# Patient Record
Sex: MALE | Race: WHITE | Employment: OTHER | URBAN - METROPOLITAN AREA
[De-identification: names, ages, dates, MRNs, and addresses within clinical notes are randomized per-mention and may not be internally consistent; named-entity substitution may affect disease eponyms.]

---

## 2018-01-01 ENCOUNTER — HOSPITAL ENCOUNTER (INPATIENT)
Age: 63
LOS: 2 days | DRG: 208 | End: 2018-08-08
Attending: EMERGENCY MEDICINE | Admitting: INTERNAL MEDICINE
Payer: COMMERCIAL

## 2018-01-01 ENCOUNTER — APPOINTMENT (OUTPATIENT)
Dept: GENERAL RADIOLOGY | Age: 63
DRG: 208 | End: 2018-01-01
Payer: COMMERCIAL

## 2018-01-01 VITALS
RESPIRATION RATE: 18 BRPM | WEIGHT: 187.17 LBS | TEMPERATURE: 100 F | DIASTOLIC BLOOD PRESSURE: 60 MMHG | OXYGEN SATURATION: 76 % | SYSTOLIC BLOOD PRESSURE: 112 MMHG | HEART RATE: 143 BPM

## 2018-01-01 DIAGNOSIS — I46.9 CARDIAC ARREST (HCC): Primary | ICD-10-CM

## 2018-01-01 LAB
ALBUMIN SERPL-MCNC: 4.4 G/DL (ref 3.4–5)
ALP BLD-CCNC: 74 U/L (ref 40–129)
ALT SERPL-CCNC: 293 U/L (ref 10–40)
ANION GAP SERPL CALCULATED.3IONS-SCNC: 12 MMOL/L (ref 3–16)
ANION GAP SERPL CALCULATED.3IONS-SCNC: 15 MMOL/L (ref 3–16)
APTT: 31 SEC (ref 26–36)
AST SERPL-CCNC: 421 U/L (ref 15–37)
B-TYPE NATRIURETIC PEPTIDE: 59 PG/ML (ref 0–99.9)
BASE EXCESS ARTERIAL: -3.3 MMOL/L (ref -3–3)
BASE EXCESS VENOUS: -12 (ref -3–3)
BASOPHILS ABSOLUTE: 0 K/UL (ref 0–0.2)
BASOPHILS ABSOLUTE: 0 K/UL (ref 0–0.2)
BASOPHILS RELATIVE PERCENT: 0.1 %
BASOPHILS RELATIVE PERCENT: 0.2 %
BILIRUB SERPL-MCNC: 1.3 MG/DL (ref 0–1)
BILIRUBIN DIRECT: 0.3 MG/DL (ref 0–0.3)
BILIRUBIN, INDIRECT: 1 MG/DL (ref 0–1)
BUN BLDV-MCNC: 31 MG/DL (ref 7–20)
BUN BLDV-MCNC: 42 MG/DL (ref 7–20)
CALCIUM IONIZED: 1.15 MMOL/L (ref 1.12–1.32)
CALCIUM SERPL-MCNC: 10.6 MG/DL (ref 8.3–10.6)
CALCIUM SERPL-MCNC: 9.6 MG/DL (ref 8.3–10.6)
CARBOXYHEMOGLOBIN ARTERIAL: 1.7 % (ref 0–1.5)
CHLORIDE BLD-SCNC: 106 MMOL/L (ref 99–110)
CHLORIDE BLD-SCNC: 106 MMOL/L (ref 99–110)
CO2: 20 MMOL/L (ref 21–32)
CO2: 21 MMOL/L (ref 21–32)
CO2: 21 MMOL/L (ref 21–32)
CREAT SERPL-MCNC: 1.4 MG/DL (ref 0.8–1.3)
CREAT SERPL-MCNC: 1.5 MG/DL (ref 0.8–1.3)
EKG ATRIAL RATE: 134 BPM
EKG ATRIAL RATE: 30 BPM
EKG ATRIAL RATE: 66 BPM
EKG ATRIAL RATE: 71 BPM
EKG ATRIAL RATE: 71 BPM
EKG DIAGNOSIS: NORMAL
EKG P AXIS: 34 DEGREES
EKG P AXIS: 43 DEGREES
EKG P-R INTERVAL: 184 MS
EKG P-R INTERVAL: 184 MS
EKG Q-T INTERVAL: 372 MS
EKG Q-T INTERVAL: 396 MS
EKG Q-T INTERVAL: 418 MS
EKG Q-T INTERVAL: 424 MS
EKG Q-T INTERVAL: 426 MS
EKG QRS DURATION: 120 MS
EKG QRS DURATION: 124 MS
EKG QRS DURATION: 152 MS
EKG QRS DURATION: 154 MS
EKG QRS DURATION: 82 MS
EKG QTC CALCULATION (BAZETT): 415 MS
EKG QTC CALCULATION (BAZETT): 444 MS
EKG QTC CALCULATION (BAZETT): 460 MS
EKG QTC CALCULATION (BAZETT): 460 MS
EKG QTC CALCULATION (BAZETT): 636 MS
EKG R AXIS: -2 DEGREES
EKG R AXIS: -64 DEGREES
EKG R AXIS: 10 DEGREES
EKG R AXIS: 19 DEGREES
EKG R AXIS: 8 DEGREES
EKG T AXIS: 120 DEGREES
EKG T AXIS: 125 DEGREES
EKG T AXIS: 138 DEGREES
EKG T AXIS: 154 DEGREES
EKG T AXIS: 156 DEGREES
EKG VENTRICULAR RATE: 134 BPM
EKG VENTRICULAR RATE: 66 BPM
EKG VENTRICULAR RATE: 68 BPM
EKG VENTRICULAR RATE: 71 BPM
EKG VENTRICULAR RATE: 92 BPM
EOSINOPHILS ABSOLUTE: 0 K/UL (ref 0–0.6)
EOSINOPHILS ABSOLUTE: 0 K/UL (ref 0–0.6)
EOSINOPHILS RELATIVE PERCENT: 0.1 %
EOSINOPHILS RELATIVE PERCENT: 0.2 %
GFR AFRICAN AMERICAN: 44
GFR AFRICAN AMERICAN: 57
GFR AFRICAN AMERICAN: >60
GFR NON-AFRICAN AMERICAN: 36
GFR NON-AFRICAN AMERICAN: 47
GFR NON-AFRICAN AMERICAN: 51
GLUCOSE BLD-MCNC: 142 MG/DL (ref 70–99)
GLUCOSE BLD-MCNC: 154 MG/DL (ref 70–99)
GLUCOSE BLD-MCNC: 215 MG/DL (ref 70–99)
HCO3 ARTERIAL: 21 MMOL/L (ref 21–29)
HCO3 VENOUS: 18.5 MMOL/L (ref 23–29)
HCT VFR BLD CALC: 44.3 % (ref 40.5–52.5)
HCT VFR BLD CALC: 44.6 % (ref 40.5–52.5)
HEMOGLOBIN, ART, EXTENDED: 15.2 G/DL
HEMOGLOBIN: 15 G/DL (ref 13.5–17.5)
HEMOGLOBIN: 15.1 G/DL (ref 13.5–17.5)
INR BLD: 1.09 (ref 0.86–1.14)
LACTATE: 10.95 MMOL/L (ref 0.4–2)
LV EF: 28 %
LVEF MODALITY: NORMAL
LYMPHOCYTES ABSOLUTE: 0.7 K/UL (ref 1–5.1)
LYMPHOCYTES ABSOLUTE: 1 K/UL (ref 1–5.1)
LYMPHOCYTES RELATIVE PERCENT: 6.3 %
LYMPHOCYTES RELATIVE PERCENT: 7.9 %
MAGNESIUM: 3.7 MG/DL (ref 1.8–2.4)
MCH RBC QN AUTO: 30.5 PG (ref 26–34)
MCH RBC QN AUTO: 30.7 PG (ref 26–34)
MCHC RBC AUTO-ENTMCNC: 33.8 G/DL (ref 31–36)
MCHC RBC AUTO-ENTMCNC: 33.8 G/DL (ref 31–36)
MCV RBC AUTO: 90 FL (ref 80–100)
MCV RBC AUTO: 90.6 FL (ref 80–100)
METHEMOGLOBIN ARTERIAL: 0.2 % (ref 0–1.4)
MONOCYTES ABSOLUTE: 0.7 K/UL (ref 0–1.3)
MONOCYTES ABSOLUTE: 0.8 K/UL (ref 0–1.3)
MONOCYTES RELATIVE PERCENT: 5.1 %
MONOCYTES RELATIVE PERCENT: 7.3 %
NEUTROPHILS ABSOLUTE: 10 K/UL (ref 1.7–7.7)
NEUTROPHILS ABSOLUTE: 11.4 K/UL (ref 1.7–7.7)
NEUTROPHILS RELATIVE PERCENT: 86.2 %
NEUTROPHILS RELATIVE PERCENT: 86.6 %
O2 SAT, ARTERIAL: 99 % (ref 93–100)
O2 SAT, VEN: 30 %
PCO2 ARTERIAL: 37 MMHG (ref 35–45)
PCO2, VEN: 68.9 MM HG (ref 40–50)
PDW BLD-RTO: 13.4 % (ref 12.4–15.4)
PDW BLD-RTO: 13.8 % (ref 12.4–15.4)
PERFORMED ON: ABNORMAL
PERFORMED ON: ABNORMAL
PERFORMED ON: NORMAL
PERFORMED ON: NORMAL
PH ARTERIAL: 7.37 (ref 7.35–7.45)
PH VENOUS: 7.04 (ref 7.35–7.45)
PHOSPHORUS: 3.7 MG/DL (ref 2.5–4.9)
PLATELET # BLD: 178 K/UL (ref 135–450)
PLATELET # BLD: 226 K/UL (ref 135–450)
PMV BLD AUTO: 8 FL (ref 5–10.5)
PMV BLD AUTO: 8.4 FL (ref 5–10.5)
PO2 ARTERIAL: 120 MMHG (ref 75–108)
PO2, VEN: 28 MM HG
POC ANION GAP: 17 (ref 10–20)
POC BUN: 30 MG/DL (ref 7–18)
POC CHLORIDE: 108 MMOL/L (ref 99–110)
POC CREATININE: 1.9 MG/DL (ref 0.8–1.3)
POC POTASSIUM: 3.3 MMOL/L (ref 3.5–5.1)
POC SAMPLE TYPE: ABNORMAL
POC SAMPLE TYPE: ABNORMAL
POC SAMPLE TYPE: NORMAL
POC SAMPLE TYPE: NORMAL
POC SODIUM: 145 MMOL/L (ref 136–145)
POC TROPONIN I: 0.01 NG/ML (ref 0–0.1)
POTASSIUM REFLEX MAGNESIUM: 4.6 MMOL/L (ref 3.5–5.1)
POTASSIUM SERPL-SCNC: 3.1 MMOL/L (ref 3.5–5.1)
PROTHROMBIN TIME: 12.4 SEC (ref 9.8–13)
RBC # BLD: 4.92 M/UL (ref 4.2–5.9)
RBC # BLD: 4.93 M/UL (ref 4.2–5.9)
SODIUM BLD-SCNC: 139 MMOL/L (ref 136–145)
SODIUM BLD-SCNC: 142 MMOL/L (ref 136–145)
TCO2 ARTERIAL: 22 MMOL/L
TCO2 CALC VENOUS: 21 MMOL/L
TOTAL PROTEIN: 7.1 G/DL (ref 6.4–8.2)
WBC # BLD: 11.6 K/UL (ref 4–11)
WBC # BLD: 13.1 K/UL (ref 4–11)

## 2018-01-01 PROCEDURE — 6360000002 HC RX W HCPCS: Performed by: STUDENT IN AN ORGANIZED HEALTH CARE EDUCATION/TRAINING PROGRAM

## 2018-01-01 PROCEDURE — 99233 SBSQ HOSP IP/OBS HIGH 50: CPT | Performed by: INTERNAL MEDICINE

## 2018-01-01 PROCEDURE — 6370000000 HC RX 637 (ALT 250 FOR IP): Performed by: STUDENT IN AN ORGANIZED HEALTH CARE EDUCATION/TRAINING PROGRAM

## 2018-01-01 PROCEDURE — 99291 CRITICAL CARE FIRST HOUR: CPT | Performed by: INTERNAL MEDICINE

## 2018-01-01 PROCEDURE — 5A1945Z RESPIRATORY VENTILATION, 24-96 CONSECUTIVE HOURS: ICD-10-PCS | Performed by: INTERNAL MEDICINE

## 2018-01-01 PROCEDURE — 94750 HC PULMONARY COMPLIANCE STUDY: CPT

## 2018-01-01 PROCEDURE — 2000000000 HC ICU R&B

## 2018-01-01 PROCEDURE — 94003 VENT MGMT INPAT SUBQ DAY: CPT

## 2018-01-01 PROCEDURE — 5A2204Z RESTORATION OF CARDIAC RHYTHM, SINGLE: ICD-10-PCS | Performed by: EMERGENCY MEDICINE

## 2018-01-01 PROCEDURE — 6360000002 HC RX W HCPCS: Performed by: INTERNAL MEDICINE

## 2018-01-01 PROCEDURE — 94664 DEMO&/EVAL PT USE INHALER: CPT

## 2018-01-01 PROCEDURE — 4500000026 HC ED CRITICAL CARE PROCEDURE

## 2018-01-01 PROCEDURE — 94770 HC ETCO2 MONITOR DAILY: CPT

## 2018-01-01 PROCEDURE — 80048 BASIC METABOLIC PNL TOTAL CA: CPT

## 2018-01-01 PROCEDURE — 0BH17EZ INSERTION OF ENDOTRACHEAL AIRWAY INTO TRACHEA, VIA NATURAL OR ARTIFICIAL OPENING: ICD-10-PCS | Performed by: EMERGENCY MEDICINE

## 2018-01-01 PROCEDURE — 83735 ASSAY OF MAGNESIUM: CPT

## 2018-01-01 PROCEDURE — 94002 VENT MGMT INPAT INIT DAY: CPT

## 2018-01-01 PROCEDURE — C9113 INJ PANTOPRAZOLE SODIUM, VIA: HCPCS | Performed by: INTERNAL MEDICINE

## 2018-01-01 PROCEDURE — 2500000003 HC RX 250 WO HCPCS: Performed by: EMERGENCY MEDICINE

## 2018-01-01 PROCEDURE — 84100 ASSAY OF PHOSPHORUS: CPT

## 2018-01-01 PROCEDURE — 71045 X-RAY EXAM CHEST 1 VIEW: CPT

## 2018-01-01 PROCEDURE — 83880 ASSAY OF NATRIURETIC PEPTIDE: CPT

## 2018-01-01 PROCEDURE — 82803 BLOOD GASES ANY COMBINATION: CPT

## 2018-01-01 PROCEDURE — 94761 N-INVAS EAR/PLS OXIMETRY MLT: CPT

## 2018-01-01 PROCEDURE — 99292 CRITICAL CARE ADDL 30 MIN: CPT

## 2018-01-01 PROCEDURE — 85025 COMPLETE CBC W/AUTO DIFF WBC: CPT

## 2018-01-01 PROCEDURE — 2500000003 HC RX 250 WO HCPCS

## 2018-01-01 PROCEDURE — 6360000002 HC RX W HCPCS: Performed by: EMERGENCY MEDICINE

## 2018-01-01 PROCEDURE — 85730 THROMBOPLASTIN TIME PARTIAL: CPT

## 2018-01-01 PROCEDURE — 84484 ASSAY OF TROPONIN QUANT: CPT

## 2018-01-01 PROCEDURE — 92950 HEART/LUNG RESUSCITATION CPR: CPT

## 2018-01-01 PROCEDURE — 99291 CRITICAL CARE FIRST HOUR: CPT

## 2018-01-01 PROCEDURE — 2700000000 HC OXYGEN THERAPY PER DAY

## 2018-01-01 PROCEDURE — 80047 BASIC METABLC PNL IONIZED CA: CPT

## 2018-01-01 PROCEDURE — 80076 HEPATIC FUNCTION PANEL: CPT

## 2018-01-01 PROCEDURE — 2580000003 HC RX 258: Performed by: STUDENT IN AN ORGANIZED HEALTH CARE EDUCATION/TRAINING PROGRAM

## 2018-01-01 PROCEDURE — 5A12012 PERFORMANCE OF CARDIAC OUTPUT, SINGLE, MANUAL: ICD-10-PCS | Performed by: EMERGENCY MEDICINE

## 2018-01-01 PROCEDURE — 85610 PROTHROMBIN TIME: CPT

## 2018-01-01 PROCEDURE — 6360000002 HC RX W HCPCS

## 2018-01-01 PROCEDURE — 93306 TTE W/DOPPLER COMPLETE: CPT

## 2018-01-01 PROCEDURE — 99255 IP/OBS CONSLTJ NEW/EST HI 80: CPT | Performed by: INTERNAL MEDICINE

## 2018-01-01 PROCEDURE — 36592 COLLECT BLOOD FROM PICC: CPT

## 2018-01-01 PROCEDURE — 83605 ASSAY OF LACTIC ACID: CPT

## 2018-01-01 PROCEDURE — 2580000003 HC RX 258: Performed by: EMERGENCY MEDICINE

## 2018-01-01 PROCEDURE — 93005 ELECTROCARDIOGRAM TRACING: CPT | Performed by: EMERGENCY MEDICINE

## 2018-01-01 RX ORDER — CALCIUM CHLORIDE 100 MG/ML
INJECTION INTRAVENOUS; INTRAVENTRICULAR DAILY PRN
Status: DISCONTINUED | OUTPATIENT
Start: 2018-01-01 | End: 2018-01-01

## 2018-01-01 RX ORDER — AMIODARONE HYDROCHLORIDE 50 MG/ML
INJECTION, SOLUTION INTRAVENOUS DAILY PRN
Status: DISCONTINUED | OUTPATIENT
Start: 2018-01-01 | End: 2018-01-01

## 2018-01-01 RX ORDER — ACETAMINOPHEN 325 MG/1
650 TABLET ORAL EVERY 4 HOURS PRN
Status: DISCONTINUED | OUTPATIENT
Start: 2018-01-01 | End: 2018-01-01

## 2018-01-01 RX ORDER — SCOLOPAMINE TRANSDERMAL SYSTEM 1 MG/1
1 PATCH, EXTENDED RELEASE TRANSDERMAL
Status: DISCONTINUED | OUTPATIENT
Start: 2018-01-01 | End: 2018-01-01 | Stop reason: HOSPADM

## 2018-01-01 RX ORDER — METOPROLOL TARTRATE 5 MG/5ML
5 INJECTION INTRAVENOUS ONCE
Status: COMPLETED | OUTPATIENT
Start: 2018-01-01 | End: 2018-01-01

## 2018-01-01 RX ORDER — SODIUM CHLORIDE 0.9 % (FLUSH) 0.9 %
10 SYRINGE (ML) INJECTION PRN
Status: DISCONTINUED | OUTPATIENT
Start: 2018-01-01 | End: 2018-01-01 | Stop reason: HOSPADM

## 2018-01-01 RX ORDER — PROPOFOL 10 MG/ML
10 INJECTION, EMULSION INTRAVENOUS
Status: DISCONTINUED | OUTPATIENT
Start: 2018-01-01 | End: 2018-01-01

## 2018-01-01 RX ORDER — MORPHINE SULFATE 2 MG/ML
2 INJECTION, SOLUTION INTRAMUSCULAR; INTRAVENOUS
Status: DISCONTINUED | OUTPATIENT
Start: 2018-01-01 | End: 2018-01-01 | Stop reason: HOSPADM

## 2018-01-01 RX ORDER — MORPHINE SULFATE 2 MG/ML
2 INJECTION, SOLUTION INTRAMUSCULAR; INTRAVENOUS
Status: DISCONTINUED | OUTPATIENT
Start: 2018-01-01 | End: 2018-01-01

## 2018-01-01 RX ORDER — LORAZEPAM 2 MG/ML
2 INJECTION INTRAMUSCULAR
Status: DISCONTINUED | OUTPATIENT
Start: 2018-01-01 | End: 2018-01-01 | Stop reason: HOSPADM

## 2018-01-01 RX ORDER — ATROPINE SULFATE 10 MG/ML
1 SOLUTION/ DROPS OPHTHALMIC
Status: DISCONTINUED | OUTPATIENT
Start: 2018-01-01 | End: 2018-01-01 | Stop reason: HOSPADM

## 2018-01-01 RX ORDER — ESMOLOL HYDROCHLORIDE 10 MG/ML
500 INJECTION, SOLUTION INTRAVENOUS ONCE
Status: DISCONTINUED | OUTPATIENT
Start: 2018-01-01 | End: 2018-01-01

## 2018-01-01 RX ORDER — SODIUM CHLORIDE 0.9 % (FLUSH) 0.9 %
10 SYRINGE (ML) INJECTION EVERY 12 HOURS SCHEDULED
Status: DISCONTINUED | OUTPATIENT
Start: 2018-01-01 | End: 2018-01-01 | Stop reason: HOSPADM

## 2018-01-01 RX ORDER — HYDROCORTISONE 5 MG/1
15 TABLET ORAL DAILY
COMMUNITY

## 2018-01-01 RX ORDER — PANTOPRAZOLE SODIUM 40 MG/10ML
40 INJECTION, POWDER, LYOPHILIZED, FOR SOLUTION INTRAVENOUS DAILY
Status: DISCONTINUED | OUTPATIENT
Start: 2018-01-01 | End: 2018-01-01

## 2018-01-01 RX ORDER — EPINEPHRINE 1 MG/ML
INJECTION, SOLUTION, CONCENTRATE INTRAVENOUS DAILY PRN
Status: COMPLETED | OUTPATIENT
Start: 2018-01-01 | End: 2018-01-01

## 2018-01-01 RX ORDER — ATORVASTATIN CALCIUM 80 MG/1
80 TABLET, FILM COATED ORAL NIGHTLY
COMMUNITY

## 2018-01-01 RX ORDER — CALCIUM CHLORIDE 100 MG/ML
INJECTION INTRAVENOUS; INTRAVENTRICULAR DAILY PRN
Status: COMPLETED | OUTPATIENT
Start: 2018-01-01 | End: 2018-01-01

## 2018-01-01 RX ORDER — OMEPRAZOLE 20 MG/1
20 CAPSULE, DELAYED RELEASE ORAL DAILY
COMMUNITY

## 2018-01-01 RX ORDER — MORPHINE SULFATE 2 MG/ML
2 INJECTION, SOLUTION INTRAMUSCULAR; INTRAVENOUS EVERY 4 HOURS PRN
Status: DISCONTINUED | OUTPATIENT
Start: 2018-01-01 | End: 2018-01-01

## 2018-01-01 RX ORDER — ACETAMINOPHEN 650 MG/1
650 SUPPOSITORY RECTAL ONCE
Status: COMPLETED | OUTPATIENT
Start: 2018-01-01 | End: 2018-01-01

## 2018-01-01 RX ORDER — MAGNESIUM SULFATE 1 G/100ML
INJECTION INTRAVENOUS CONTINUOUS PRN
Status: DISCONTINUED | OUTPATIENT
Start: 2018-01-01 | End: 2018-01-01

## 2018-01-01 RX ORDER — ASPIRIN 325 MG
325 TABLET ORAL DAILY
COMMUNITY

## 2018-01-01 RX ORDER — POTASSIUM CHLORIDE 29.8 MG/ML
20 INJECTION INTRAVENOUS
Status: ACTIVE | OUTPATIENT
Start: 2018-01-01 | End: 2018-01-01

## 2018-01-01 RX ORDER — LORAZEPAM 2 MG/ML
2 INJECTION INTRAMUSCULAR EVERY 4 HOURS PRN
Status: DISCONTINUED | OUTPATIENT
Start: 2018-01-01 | End: 2018-01-01

## 2018-01-01 RX ORDER — MAGNESIUM SULFATE 1 G/100ML
INJECTION INTRAVENOUS CONTINUOUS PRN
Status: COMPLETED | OUTPATIENT
Start: 2018-01-01 | End: 2018-01-01

## 2018-01-01 RX ORDER — MORPHINE SULFATE 4 MG/ML
4 INJECTION, SOLUTION INTRAMUSCULAR; INTRAVENOUS
Status: DISCONTINUED | OUTPATIENT
Start: 2018-01-01 | End: 2018-01-01

## 2018-01-01 RX ORDER — HEPARIN SODIUM 5000 [USP'U]/ML
5000 INJECTION, SOLUTION INTRAVENOUS; SUBCUTANEOUS EVERY 8 HOURS SCHEDULED
Status: DISCONTINUED | OUTPATIENT
Start: 2018-01-01 | End: 2018-01-01

## 2018-01-01 RX ORDER — LEVOTHYROXINE SODIUM 0.03 MG/1
25 TABLET ORAL DAILY
COMMUNITY

## 2018-01-01 RX ADMIN — LORAZEPAM 2 MG: 2 INJECTION INTRAMUSCULAR; INTRAVENOUS at 19:45

## 2018-01-01 RX ADMIN — SODIUM BICARBONATE 50 MEQ: 84 INJECTION, SOLUTION INTRAVENOUS at 09:52

## 2018-01-01 RX ADMIN — MORPHINE SULFATE 4 MG: 4 INJECTION INTRAVENOUS at 16:07

## 2018-01-01 RX ADMIN — EPINEPHRINE 1 MG: 0.1 INJECTION, SOLUTION ENDOTRACHEAL; INTRACARDIAC; INTRAVENOUS at 10:14

## 2018-01-01 RX ADMIN — PROPOFOL 35 MCG/KG/MIN: 10 INJECTION, EMULSION INTRAVENOUS at 08:42

## 2018-01-01 RX ADMIN — LORAZEPAM 2 MG: 2 INJECTION INTRAMUSCULAR; INTRAVENOUS at 17:22

## 2018-01-01 RX ADMIN — ATROPINE SULFATE 1 DROP: 10 SOLUTION/ DROPS OPHTHALMIC at 17:38

## 2018-01-01 RX ADMIN — METOPROLOL TARTRATE 5 MG: 5 INJECTION, SOLUTION INTRAVENOUS at 12:20

## 2018-01-01 RX ADMIN — PROPOFOL 40 MCG/KG/MIN: 10 INJECTION, EMULSION INTRAVENOUS at 02:00

## 2018-01-01 RX ADMIN — EPINEPHRINE 1 MG: 0.1 INJECTION, SOLUTION ENDOTRACHEAL; INTRACARDIAC; INTRAVENOUS at 10:03

## 2018-01-01 RX ADMIN — HEPARIN SODIUM 5000 UNITS: 5000 INJECTION, SOLUTION INTRAVENOUS; SUBCUTANEOUS at 07:32

## 2018-01-01 RX ADMIN — AMIODARONE HYDROCHLORIDE 75 MG: 50 INJECTION, SOLUTION INTRAVENOUS at 10:35

## 2018-01-01 RX ADMIN — MORPHINE SULFATE 2 MG: 2 INJECTION, SOLUTION INTRAMUSCULAR; INTRAVENOUS at 02:45

## 2018-01-01 RX ADMIN — HEPARIN SODIUM 5000 UNITS: 5000 INJECTION, SOLUTION INTRAVENOUS; SUBCUTANEOUS at 22:27

## 2018-01-01 RX ADMIN — HYDROCORTISONE SODIUM SUCCINATE 50 MG: 100 INJECTION, POWDER, FOR SOLUTION INTRAMUSCULAR; INTRAVENOUS at 16:06

## 2018-01-01 RX ADMIN — HYDROCORTISONE SODIUM SUCCINATE 50 MG: 100 INJECTION, POWDER, FOR SOLUTION INTRAMUSCULAR; INTRAVENOUS at 11:22

## 2018-01-01 RX ADMIN — EPINEPHRINE 1 MG: 0.1 INJECTION, SOLUTION ENDOTRACHEAL; INTRACARDIAC; INTRAVENOUS at 12:24

## 2018-01-01 RX ADMIN — AMIODARONE HYDROCHLORIDE 1 MG/MIN: 50 INJECTION, SOLUTION INTRAVENOUS at 10:06

## 2018-01-01 RX ADMIN — METOPROLOL TARTRATE 5 MG: 5 INJECTION, SOLUTION INTRAVENOUS at 10:35

## 2018-01-01 RX ADMIN — LORAZEPAM 2 MG: 2 INJECTION INTRAMUSCULAR; INTRAVENOUS at 03:15

## 2018-01-01 RX ADMIN — MAGNESIUM SULFATE IN DEXTROSE 2 G: 10 INJECTION, SOLUTION INTRAVENOUS at 12:12

## 2018-01-01 RX ADMIN — CALCIUM CHLORIDE 1 G: 100 INJECTION, SOLUTION INTRAVENOUS; INTRAVENTRICULAR at 12:19

## 2018-01-01 RX ADMIN — LORAZEPAM 2 MG: 2 INJECTION INTRAMUSCULAR; INTRAVENOUS at 13:41

## 2018-01-01 RX ADMIN — MORPHINE SULFATE 2 MG: 2 INJECTION, SOLUTION INTRAMUSCULAR; INTRAVENOUS at 13:14

## 2018-01-01 RX ADMIN — ATROPINE SULFATE 1 DROP: 10 SOLUTION/ DROPS OPHTHALMIC at 18:33

## 2018-01-01 RX ADMIN — SODIUM BICARBONATE 50 MEQ: 84 INJECTION, SOLUTION INTRAVENOUS at 10:23

## 2018-01-01 RX ADMIN — HYDROCORTISONE SODIUM SUCCINATE 50 MG: 100 INJECTION, POWDER, FOR SOLUTION INTRAMUSCULAR; INTRAVENOUS at 04:43

## 2018-01-01 RX ADMIN — NOREPINEPHRINE BITARTRATE 10 MCG/MIN: 1 INJECTION INTRAVENOUS at 10:37

## 2018-01-01 RX ADMIN — PANTOPRAZOLE SODIUM 40 MG: 40 INJECTION, POWDER, FOR SOLUTION INTRAVENOUS at 08:43

## 2018-01-01 RX ADMIN — CALCIUM CHLORIDE 1 G: 100 INJECTION, SOLUTION INTRAVENOUS; INTRAVENTRICULAR at 09:46

## 2018-01-01 RX ADMIN — Medication 10 ML: at 22:28

## 2018-01-01 RX ADMIN — METOPROLOL TARTRATE 5 MG: 5 INJECTION, SOLUTION INTRAVENOUS at 10:30

## 2018-01-01 RX ADMIN — EPINEPHRINE 1 MG: 0.1 INJECTION, SOLUTION ENDOTRACHEAL; INTRACARDIAC; INTRAVENOUS at 12:11

## 2018-01-01 RX ADMIN — LORAZEPAM 2 MG: 2 INJECTION INTRAMUSCULAR; INTRAVENOUS at 02:12

## 2018-01-01 RX ADMIN — NOREPINEPHRINE BITARTRATE 2 MCG/MIN: 1 INJECTION INTRAVENOUS at 10:00

## 2018-01-01 RX ADMIN — MORPHINE SULFATE 2 MG: 2 INJECTION, SOLUTION INTRAMUSCULAR; INTRAVENOUS at 18:31

## 2018-01-01 RX ADMIN — ACETAMINOPHEN 650 MG: 650 SUPPOSITORY RECTAL at 07:32

## 2018-01-01 RX ADMIN — MORPHINE SULFATE 2 MG: 2 INJECTION, SOLUTION INTRAMUSCULAR; INTRAVENOUS at 01:13

## 2018-01-01 RX ADMIN — MORPHINE SULFATE 2 MG: 2 INJECTION, SOLUTION INTRAMUSCULAR; INTRAVENOUS at 19:45

## 2018-01-01 RX ADMIN — LIDOCAINE HYDROCHLORIDE 100 MG: 20 INJECTION, SOLUTION INTRAVENOUS at 12:20

## 2018-01-01 RX ADMIN — LORAZEPAM 2 MG: 2 INJECTION INTRAMUSCULAR; INTRAVENOUS at 21:49

## 2018-01-01 RX ADMIN — PROPOFOL 20 MCG/KG/MIN: 10 INJECTION, EMULSION INTRAVENOUS at 21:10

## 2018-01-01 RX ADMIN — HYDROCORTISONE SODIUM SUCCINATE 100 MG: 100 INJECTION, POWDER, FOR SOLUTION INTRAMUSCULAR; INTRAVENOUS at 10:24

## 2018-01-01 RX ADMIN — MORPHINE SULFATE 2 MG: 2 INJECTION, SOLUTION INTRAMUSCULAR; INTRAVENOUS at 03:15

## 2018-01-01 RX ADMIN — EPINEPHRINE 1 MG: 0.1 INJECTION, SOLUTION ENDOTRACHEAL; INTRACARDIAC; INTRAVENOUS at 12:21

## 2018-01-01 RX ADMIN — EPINEPHRINE 1 MG: 0.1 INJECTION, SOLUTION ENDOTRACHEAL; INTRACARDIAC; INTRAVENOUS at 09:51

## 2018-01-01 RX ADMIN — EPINEPHRINE 1 MG: 0.1 INJECTION, SOLUTION ENDOTRACHEAL; INTRACARDIAC; INTRAVENOUS at 12:16

## 2018-01-01 RX ADMIN — MORPHINE SULFATE 2 MG: 2 INJECTION, SOLUTION INTRAMUSCULAR; INTRAVENOUS at 02:12

## 2018-01-01 RX ADMIN — LORAZEPAM 2 MG: 2 INJECTION INTRAMUSCULAR; INTRAVENOUS at 22:42

## 2018-01-01 RX ADMIN — MORPHINE SULFATE 2 MG: 2 INJECTION, SOLUTION INTRAMUSCULAR; INTRAVENOUS at 21:49

## 2018-01-01 RX ADMIN — LORAZEPAM 2 MG: 2 INJECTION INTRAMUSCULAR; INTRAVENOUS at 02:45

## 2018-01-01 RX ADMIN — Medication 10 ML: at 21:50

## 2018-01-01 RX ADMIN — HYDROCORTISONE SODIUM SUCCINATE 50 MG: 100 INJECTION, POWDER, FOR SOLUTION INTRAMUSCULAR; INTRAVENOUS at 22:26

## 2018-01-01 RX ADMIN — NOREPINEPHRINE BITARTRATE 5 MCG/MIN: 1 INJECTION INTRAVENOUS at 10:10

## 2018-01-01 RX ADMIN — EPINEPHRINE 1 MG: 1 INJECTION, SOLUTION INTRAMUSCULAR; SUBCUTANEOUS at 09:45

## 2018-01-01 RX ADMIN — LORAZEPAM 2 MG: 2 INJECTION INTRAMUSCULAR; INTRAVENOUS at 01:13

## 2018-01-01 RX ADMIN — MAGNESIUM SULFATE IN DEXTROSE 1 G: 10 INJECTION, SOLUTION INTRAVENOUS at 09:51

## 2018-01-01 RX ADMIN — Medication 10 ML: at 08:45

## 2018-01-01 RX ADMIN — ATROPINE SULFATE 1 DROP: 10 SOLUTION/ DROPS OPHTHALMIC at 19:02

## 2018-01-01 RX ADMIN — EPINEPHRINE 1 MG: 0.1 INJECTION, SOLUTION ENDOTRACHEAL; INTRACARDIAC; INTRAVENOUS at 10:28

## 2018-01-01 RX ADMIN — EPINEPHRINE 1 MG: 0.1 INJECTION, SOLUTION ENDOTRACHEAL; INTRACARDIAC; INTRAVENOUS at 09:59

## 2018-01-01 RX ADMIN — EPINEPHRINE 1 MG: 0.1 INJECTION, SOLUTION ENDOTRACHEAL; INTRACARDIAC; INTRAVENOUS at 10:18

## 2018-01-01 RX ADMIN — LIDOCAINE HYDROCHLORIDE 100 MG: 20 INJECTION, SOLUTION INTRAVENOUS at 09:44

## 2018-01-01 RX ADMIN — MORPHINE SULFATE 2 MG: 2 INJECTION, SOLUTION INTRAMUSCULAR; INTRAVENOUS at 22:43

## 2018-01-01 ASSESSMENT — PAIN SCALES - GENERAL
PAINLEVEL_OUTOF10: 0

## 2018-01-01 ASSESSMENT — PULMONARY FUNCTION TESTS
PIF_VALUE: 15
PIF_VALUE: 16
PIF_VALUE: 15
PIF_VALUE: 25
PIF_VALUE: 15
PIF_VALUE: 16

## 2018-08-06 PROBLEM — I46.9 CARDIAC ARREST (HCC): Status: ACTIVE | Noted: 2018-01-01

## 2018-08-06 NOTE — CONSULTS
40 mmHg assuming a right atrial pressure of 15 mmHg.   The right ventricle is normal in size. Right ventricular systolic function   is mildly reduced . Bi-atrial enlargement. IVC size is dilated (>2.1 cm) and   collapses < 50% with respiration consistent with elevated RA pressure (15   mmHg). Last echo done at [facility] on (3/19/18). Assessment & Plan:    Patient Active Problem List:     Cardiac arrest [facility])     Status post cardiac arrest with prolonged unresponsiveness and prolonged resuscitation  Note a candidate for left heart cath due to hemodynamic instability -cardiology following, and apparent severe neurologic impairment  Echo with severe diffuse hypokinesia, biatrial enlargement, dilated IVC  Acute respiratory failure on mechanical vent support  Suspected severe anoxic injury  Hypothermia -  with temperature of 95.4  Suspected acute kidney injury. Creatinine is 1.4, no baseline creatinine to compare  Elevated liver enzymes, likely due to shock liver  Lactic acidosis    Continue vent support  Continue amiodarone  If blood pressure drops we'll start Levophed  No cooling due to prolonged resuscitation and current temperature of 35.2C  Serial lactate  Serial troponin  Serial liver enzymes  Monitor renal function, urine output, and electrolytes  I was planning to start him on propofol, due to repeated myoclonic jerks but will hold it for now until he gets evaluated by neurology. Discussed with Dr. Maynard Rad   Start heparin and PPI prophylaxis  Code status was discussed with the wife who agreed to change it to DNR    Pt has a high probability of imminent or life-threatening deterioration requiring close monitoring, and highly complex decision-making and/or interventions of high intensity to assess, manipulate, and support his critical organ systems to prevent a likely inevitable decline which could occur if left untreated.      A total critical care time 65 minutes was used.  This includes but not limited to

## 2018-08-06 NOTE — ED NOTES
Pt presents to the ED in [address] via [employer]. Pt was at home, \"passed out while on the toilet\" per wife. Pt was shocked 4 times en route, given 2 does of epinephrine, and 1 dose of amiodarone, via an IO. CPR resumed by staff when pt arrives to the ED.       Sahara Day RN  08/06/18 2326

## 2018-08-06 NOTE — ED NOTES
Bed:  2[facility]-  Expected date:   Expected time:   Means of arrival:   Comments:  [address], RN  08/06/18 0571

## 2018-08-06 NOTE — H&P
family    Alcohol: socially drinking  Illicit drugs: patient denies use  Tobacco:  No smoking    Family History:  No family history on file. MEDICATIONS:     No current facility-administered medications on file prior to encounter. No current outpatient prescriptions on file prior to encounter. Scheduled Meds:   sodium chloride flush  10 mL Intravenous 2 times per day    hydrocortisone sodium succinate PF  50 mg Intravenous Q6H      Continuous Infusions:   norepinephrine Stopped (08/06/18 1500)    magnesium sulfate      amiodarone 450mg/250ml D5W infusion 1 mg/min (08/06/18 1006)     PRN Meds:amiodarone, EPINEPHrine PF, lidocaine (cardiac), calcium chloride, magnesium sulfate, sodium chloride flush, acetaminophen    Allergies: Allergies   Allergen Reactions    Other Anaphylaxis     Crab meat     Shellfish-Derived Products Anaphylaxis    Fenofibrate Diarrhea, Nausea And Vomiting and Other (See Comments)     Diarrhea & GI Upet    Simvastatin      Other reaction(s): Muscle Aches       PHYSICAL EXAM:         Vitals: BP (!) 142/104   Pulse 70   Temp 95.5 °F (35.3 °C) (Core)   Resp 19   Wt 187 lb 2.7 oz (84.9 kg)   SpO2 99%     I/O:      Intake/Output Summary (Last 24 hours) at 08/06/18 1652  Last data filed at 08/06/18 1609   Gross per 24 hour   Intake                0 ml   Output              295 ml   Net             -295 ml     I/O this shift:  In: -   Out: 295 [Urine:295]  No intake/output data recorded.     Physical Exam    INITIAL VITALS: BP: 89/64,  , Pulse: 152, Resp: 23, SpO2: 91 %   General: 61-year-old male currently undergoing CPR  HEENT:  head is atraumatic, pupils 3 mm, minimally reactive, sclera are clear  Neck: Trachea midline  Chest: Equal chest rise with, I gel ventilation  Cardiovascular:   Abdominal: No evidence of any significant distention  Skin: Cool, poorly perfused, no rashes  Extremities: no obvious deformities, no gross evidence of trauma  Neurologic: Unresponsive, does take spontaneous ventilation does not move any extremities to either verbal or painful stimuli                                                  ETT Day#:1  Vent Settings: Vent Mode: AC/PRVC Rate Set: 12 bmp/Vt Ordered: 500 mL/ /FiO2 : 100 %    Recent Labs      08/06/18   1524   PHART  7.371   [id number]  37.0   PO2ART  120.0*       DATA:         Labs:  CBC:   Recent Labs      08/06/18   1524   WBC  13.1*   HGB  15.1   HCT  44.6   PLT  226       BMP:   Recent Labs      08/06/18   0953  08/06/18   1524   NA   --   142   K   --   3.1*   CL   --   106   CO2  20*  21   BUN   --   31*   CREATININE  1.9*  1.4*   GLUCOSE   --   215*     LFT's:   Recent Labs      08/06/18   1524   AST  421*   ALT  293*   BILITOT  1.3*   ALKPHOS  74     Troponin:   Recent Labs      08/06/18   0951   TROPONINI  0.01     BNP:   Recent Labs      08/06/18   0957   BNP  59.0     ABGs:   Recent Labs      08/06/18   1524   PHART  7.371   [id number]  37.0   PO2ART  120.0*     INR:   Recent Labs      08/06/18   1524   INR  1.09       U/A:No results for input(s): NITRITE, COLORU, PHUR, LABCAST, WBCUA, RBCUA, MUCUS, TRICHOMONAS, YEAST, BACTERIA, CLARITYU, SPECGRAV, LEUKOCYTESUR, UROBILINOGEN, BILIRUBINUR, BLOODU, GLUCOSEU, AMORPHOUS in the last 72 hours. Invalid input(s): KETONESU    XR CHEST PORTABLE   Final Result      1. Mild cardiomegaly. 2.  ET tube in good position in the midtrachea. 3.  No localized consolidation. ASSESSMENT AND PLAN:     Kodak Zeng is a 58 y.o. male,   h/o PMHx of Mitral valve reg, Hypopituitary tumor resection  p/w ED with Hx passing out with sweating of 30 minutes duration,  who was admitted with <principal problem not specified>. Cardiac arrest: 58ears old male with PMHx of MR,  presented with Hx of sweating, passing out after large watery bowel movement. Cardiology report showed last echo was normal EF, but he developed ST segment depression that was compatible with ischemia.  He also had periods of wide complex tachycardia that was suggestive of VT but cardiology thought it probably was abberancy. The patient had a LDI a exclude ischemia using another technology. -echocardiogram.   -Levophed 16 mg in dextrose infusion  -amiodarone 450 mg in dextrose infusion  -strict I/o's       Acute kidney injury mainly 2/2 to dehydration vs decrease cardiac output : large watery bowel movement, BUN 31, creatinine 1.4   -Check RFT  -Gentle IVF           Code Status: Full Code  FEN: Diet NPO Effective Now   DISPO: ICU      This patient has been staffed and discussed with Carolin Akins MD.     Electronically Signed: Kosta Lomeli MD,   PGY-1 Medicine Resident  Pager: [phone]  8/6/2018 4:52 PM     Patient seen and examined, labs and imaging studies reviewed, agree with assessment and plan as outlined above.   Continue with care    MD Noemí Driscoll

## 2018-08-06 NOTE — CONSULTS
inability to cooperate  Cardiovascular: pulses symmetric in all 4 extremities. No peripheral edema. Psychiatric: limited exam given encephalopathy but not agitated  Neurologic  Mental status: limited exam given encephalopathy  orientation unable to assess given comatose state  General fund of knowledge limited exam given encephalopathy   Memory limited exam given encephalopathy  Attention poor  Language limited exam given encephalopathy  Comprehension not following any commands  CN2: Visual Fields: no blink to either side with threat  CN 3,4,6:  extraocular muscles with some movement to the left. Pupils 7mm and non-reactive left. Right 7mm and question slightly reactive versus hippus  CN5: facial sensation limited exam given encephalopathy  CN7:face symmetric but exam limited by ET tube  CN8: hearing limited exam given encephalopathy  CN9: limited exam given encephalopathy  CN11: limited exam given encephalopathy  CN12: limited exam given encephalopathy  Strength: some weak flexion of right upper limb and left great toe with some flexion/extension movement but otherwise no movement to central or peripheral pain. Deep tendon reflexes: depressed in all 4 extremities  Sensory: no response to pain in all 4 extremities. Cerebellar/coordination:limited exam given encephalopathy  Tone: normal in all 4 extremities  Gait: limited exam given encephalopathy and intubated with ventilator. Labs  Alt and ast: 293 and 421      Impression:  Lopez Jimenez is a 58 y.o. male who presented with recurrent, prolonged cardiac arrest now with myoclonic status epilepticus involving face and eyes starting within the first 12 hours of cardiac arrest.  I discussed with his family the implications of MSE in setting of acute anoxic brain injury.       Recommendations:  -I definitely agree with DNR status as the brain could not handle further injury  -I think comfort measures only is very appropriate in this situation given essentially no chance of recovery better than persistent vegetative state. cvEEG (possibly with paralytics) could be considered but unlikely to change his outcome.     A copy of this note was provided for MD Candi Murguia MD  [phone]  Evenings, weekends, and off weeks please discuss neurologist on-call    CC=45 min

## 2018-08-06 NOTE — ED PROVIDER NOTES
[address] [facility]          ATTENDING PHYSICIAN NOTE       Date of evaluation: 8/6/2018    Chief Complaint     Cardiac Arrest      History of Present Illness     Patti Grossman is a 58 y.o. male who presents Emergency Department in cardiac arrest.  The patient has a history of heart disease as well as a history of hypo-thyroidism and hypopituitarism status post pituitary resection. He was witnessed to have multiple syncopal episodes at home this morning and then had a witnessed arrest where he was unarousable. He had compressions started by family. CPR was continued by EMS who gave the full rounds of epinephrine as well as amiodarone IV. The patient is unable to provide a history based on his current medical condition    Review of Systems     Per family, the patient was in his usual state of health recently without any fevers, chills, nausea, vomiting, complaints of chest pain, exertional chest pain, abdominal pain or any other complaints    Past Medical, Surgical, Family, and Social History     He has no past medical history on file. He has no past surgical history on file. His family history is not on file. He     Medications     Previous Medications    No medications on file       Allergies     He has no allergies on file.     Physical Exam     INITIAL VITALS: BP: 89/64,  , Pulse: 152, Resp: 23, SpO2: 91 %   General: 60-year-old male currently undergoing CPR  HEENT:  head is atraumatic, pupils 3 mm, minimally reactive, sclera are clear  Neck: Trachea midline  Chest: Equal chest rise with, I gel ventilation  Cardiovascular: Pulseless generally poorly perfused  Abdominal: No evidence of any significant distention  Skin: Cool, poorly perfused, no rashes  Extremities: no obvious deformities, no gross evidence of trauma  Neurologic: Unresponsive, does take spontaneous ventilation does not move any extremities to either verbal or painful stimuli    Diagnostic Results     EKG

## 2018-08-06 NOTE — CONSULTS
views. Baseline Echo Findings: The LV ejection fraction at rest is 45-50%. All segments are normal. Normal LV chamber size and thickness. Post Stress Echo Findings: The LV ejection fraction post stress is  70-75%. EKG (6/19/2018: EKG showed an intraventricular conduction delay with left ventricular hypertrophy    EKG (8/6/18): initially showed undetermined intraventricular conduction block with wide QRS and accelerated junctional rhythm    EKG (post-ROSC): sinus rhythm with sinus arrhythmia with occasional PVCs, non specific intraventricular conduction delay, marked ST abnormality, possible anterior subendocardial injury    ECHO (3/19/18): Overall left ventricular ejection fraction is estimated to be 45-50%. The left ventricular function is low normal.  There is moderate concentric left ventricular hypertrophy. The left atrium is moderately dilated. The mitral valve leaflets are moderately thickened in appearance. There is mild mitral valve prolapse. There is moderate mitral regurgitation. Mild aortic root dilatation. Right ventricular systolic pressure is normal at <35 mmHg. Mild tricuspid regurgitation is present. bubble study neg for right to left shunting  bubble study neg for right to left shunting.         Current Facility-Administered Medications:     norepinephrine (LEVOPHED) 16 mg in dextrose 5 % 250 mL infusion, 2 mcg/min, Intravenous, Continuous, Angela Washington MD, Last Rate: 9.4 mL/hr at 08/06/18 1037, 10 mcg/min at 08/06/18 1037    amiodarone (CORDARONE) injection, , Intravenous, Daily PRN, Angela Washington MD, 75 mg at 08/06/18 1035    EPINEPHrine PF 1 MG/10ML injection, , Intravenous, Daily PRN, Angela Washington MD, 1 mg at 08/06/18 1224    lidocaine (cardiac) (XYLOCAINE) injection, , Intravenous, Daily PRN, Angela Washington MD, 100 mg at 08/06/18 1220    calcium chloride 10 % injection, , Intravenous, Daily PRN, Angela Washington MD, 1 g at 08/06/18 1219    magnesium sulfate 1 g in dextrose 5% 100 mL IVPB, , Intravenous, Continuous PRN, Adrian Figueroa MD, 2 g at 08/06/18 1212    esmolol (BREVIBLOC) 2.5gm/250ml NS infusion, 500 mcg/kg/min (Order-Specific), Intravenous, Once, Adrian Figueroa MD    amiodarone (CORDARONE) 450 mg in dextrose 5 % 250 mL infusion, 1 mg/min, Intravenous, Continuous, Adrian Figueroa MD, Last Rate: 33.3 mL/hr at 08/06/18 1006, 1 mg/min at 08/06/18 1006    sodium chloride flush 0.9 % injection 10 mL, 10 mL, Intravenous, 2 times per day, Sue Rider MD    sodium chloride flush 0.9 % injection 10 mL, 10 mL, Intravenous, PRN, Sue Rider MD    acetaminophen (TYLENOL) tablet 650 mg, 650 mg, Per NG tube, Q4H PRN, Sue Rider MD    hydrocortisone sodium succinate PF (SOLU-CORTEF) injection 50 mg, 50 mg, Intravenous, Q6H, Sue Rider MD  No current outpatient prescriptions on file. Assessment:  Patient Active Problem List   Diagnosis    Cardiac arrest [facility])     Plan:  Cardiac arrest;  At this time, patient not HD stable from cardiology standpoint to undergo angiogram/cardiac catheterization   -continue medical mmgt per primary team     Assessment and plan discussed with attending physician, Mateo Patel M.D. [employer] Kamilla Campos M.D. Internal Medicine PGY-1  Pager: [phone]       Staff Note      Patient seen and evaluated with the medical resident. I was physically present during the critical portions of the service when performed by the resident including the assessment and management of the patient. ECG after restoration of SR with NSST/T wave changes. Will need to stabilize patient hemodynamically and assess neurologic function after prolonged ACLS and CPR. Patient is critically ill. Continue amiodarone and levophed and continue supportive care. I agree with the findings and plans as described.

## 2018-08-07 NOTE — PROGRESS NOTES
encephalopathy   Tone: normal in all 4 extremities   Gait: limited exam given encephalopathy and intubated with ventilator. Ext:  No c/c. Mild edema in hands and feet bilaterally   Pulses: weak, but intact radial pulses    Lab Results   Component Value Date    WBC 11.6 (H) 08/07/2018    HGB 15.0 08/07/2018    HCT 44.3 08/07/2018    MCV 90.0 08/07/2018     08/07/2018     Lab Results   Component Value Date     08/07/2018    K 4.6 08/07/2018     08/07/2018    CO2 21 08/07/2018    BUN 42 (H) 08/07/2018    CREATININE 1.5 (H) 08/07/2018    GLUCOSE 142 (H) 08/07/2018    CALCIUM 9.6 08/07/2018    PROT 7.1 08/06/2018    LABALBU 4.4 08/06/2018    BILITOT 1.3 (H) 08/06/2018    ALKPHOS 74 08/06/2018     (H) 08/06/2018     (H) 08/06/2018    LABGLOM 47 (A) 08/07/2018    GFRAA 57 (A) 08/07/2018     CD Stress (3/19/18): Interpetation Summary:  Post stress imaging technically difficult due to lung interference. 1. Positive ECG for ischemia with graded exercise test.   2. Duke Treadmill Score is 0 which indicates intermediate risk. 3. Negative stress echo with no indication of inducible ischemia. 4. Fitness Level: Above Average for sex and age. 5. No evidence of subclincal LV dysfunction due to MR.     BP Response to Stress: Normal blood pressure response. ECG at Peak Stress: No significant changes from baseline. No  conduction abnormalities. Normal axis. 2 mm of horizontal ST  depression in the inferolateral leads. Arrhythmia: Occasional PVC's. Occasional PAC's. Recovery Arrhythmia:  PVC's. Image Quality: Diagnostic quality echo images obtained pre stress. Images were obtained between 00:42-01:59 seconds after stress. This  corresponds to a HR of 117-86 bpm. Diagnostic quality echo images  obtained post stress. These images were obtained in 4 of the four  standard views. Baseline Echo Findings: The LV ejection fraction at rest is 45-50%.   All segments are normal. Normal LV chamber size and thickness. Post Stress Echo Findings: The LV ejection fraction post stress is  70-75%.     EKG (6/19/2018: EKG showed an intraventricular conduction delay with left ventricular hypertrophy     EKG (8/6/18): initially showed undetermined intraventricular conduction block with wide QRS and accelerated junctional rhythm     EKG (post-ROSC): sinus rhythm with sinus arrhythmia with occasional PVCs, non specific intraventricular conduction delay, marked ST abnormality, possible anterior subendocardial injury    ECHO (8/6/18): Summary   Left ventricular cavity size is dilated. There is mild to moderate   concentric left ventricular hypertrophy. Overall left ventricular systolic   function appears severely reduced with an ejection fraction of 25-30%. There   is severe diffuse hypokinesis. The inferior and inferolateral wall are   severely hypokinetic. Moderate to severe mitral regurgitation is present.   Mild-to-moderate tricuspid regurgitation. Estimated pulmonary artery   systolic pressure is at 40 mmHg assuming a right atrial pressure of 15 mmHg.   The right ventricle is normal in size. Right ventricular systolic function   is mildly reduced . Bi-atrial enlargement. IVC size is dilated (>2.1 cm) and   collapses < 50% with respiration consistent with elevated RA pressure (15   mmHg).  Last echo done at [facility] on (3/19/18).       Current Facility-Administered Medications:     norepinephrine (LEVOPHED) 16 mg in dextrose 5 % 250 mL infusion, 2 mcg/min, Intravenous, Continuous, Rashid Mason MD, Stopped at 08/06/18 1500    amiodarone (CORDARONE) injection, , Intravenous, Daily PRN, Rashid Mason MD, 75 mg at 08/06/18 1035    EPINEPHrine PF 1 MG/10ML injection, , Intravenous, Daily PRN, Rashid Mason MD, 1 mg at 08/06/18 1224    lidocaine (cardiac) (XYLOCAINE) injection, , Intravenous, Daily PRN, Rashid Mason MD, 100 mg at 08/06/18 1220    calcium chloride 10 % injection, , Intravenous,

## 2018-08-07 NOTE — PROGRESS NOTES
ICU Progress Note    Admit Date: 8/6/2018  ICU Day:  Hospital Day: 2  Vent Day: day 2  Vent Settings: Vent Mode: AC/PRVC Rate Set: 12 bmp/Vt Ordered: 500 mL/ /FiO2 : 70 %  IV Access: Peripheral  Vasopressors: Norepinephrine  Hold today by family wishes   Diet: Diet NPO Effective Now  Code Status: DNR-CCA     Interval history: Chief Complaint: Loss of consciousness . 58years old male with 58 y.o. male with h/o PMHx of Mitral valve reg, Hypopituitary tumor resection   p/w ED with Hx Loss of consciousness  with sweating of 30 minutes duration after large watery Bowel motion. CPR with multiple rounds of Epiniphrine was given by EMS, [facility] ED showed wide complex rhythm. Cardiology was consulted for possible cath. Upon arrival of cardiology team, patient was still in [address] with no pulse. CPR was being administered, patient was intubated and being bagged, and additional boluses of epinephrine, metoprolol and amio were administered along with defibrillations. A pulse was finally achieved and patient went back into narrow complex HR within 60s and SBPs around 100 on Levophed 10.     Over night events Blood pressure drop to 76/50 wife decline use of pressors for BP, wish comfort care Decreased FIO2 from 80 to 70%      Medications:     Scheduled Meds:   sodium chloride flush  10 mL Intravenous 2 times per day    hydrocortisone sodium succinate PF  50 mg Intravenous Q6H    pantoprazole  40 mg Intravenous Daily    heparin (porcine)  5,000 Units Subcutaneous 3 times per day     Continuous Infusions:   norepinephrine Stopped (08/06/18 1500)    magnesium sulfate      propofol 40 mcg/kg/min (08/07/18 0200)     PRN Meds:amiodarone, EPINEPHrine PF, lidocaine (cardiac), calcium chloride, magnesium sulfate, sodium chloride flush, acetaminophen    Objective:     Vitals:   T-max:100.8  F    Patient Vitals for the past 8 hrs:   BP Temp Temp src Pulse Resp SpO2   08/07/18 0734 - - - 110 20 97 %   08/07/18 0634 - 100.8 °F (38.2 °C) CORE - - -   08/07/18 0600 87/71 - - 105 19 97 %   08/07/18 0500 91/72 - - 101 18 -   08/07/18 0444 - - - 102 18 99 %   08/07/18 0443 - - - - 18 100 %   08/07/18 0400 83/69 100.2 °F (37.9 °C) CORE 99 19 98 %   08/07/18 0300 86/69 - - 97 22 98 %   08/07/18 0245 - - - 97 19 99 %   08/07/18 0230 - - - 97 18 98 %   08/07/18 0215 - - - 98 18 98 %   08/07/18 0200 85/68 - - 97 18 98 %   08/07/18 0145 - - - 97 18 98 %   08/07/18 0130 - - - 97 19 98 %   08/07/18 0115 - - - 95 17 99 %   08/07/18 0100 97/76 - - 94 19 98 %   08/07/18 0050 - - - 95 16 100 %   08/07/18 0041 - - - - - 100 %       Intake/Output Summary (Last 24 hours) at 08/07/18 0810  Last data filed at 08/07/18 0626   Gross per 24 hour   Intake           469.82 ml   Output              770 ml   Net          -300.18 ml       Physical Exam    General: 61-year-old male, intubated.   HEENT:  head is atraumatic, minimally reactive, sclera are clear  Neck: Trachea midline  Chest: Equal chest rise with, I gel ventilation  Cardiovascular: normal heart sound, no murmur  Abdominal: No evidence of any significant distention  Skin: Cool, poorly perfused, no rashes  Extremities: no obvious deformities, no gross evidence of trauma  Neurologic: Unresponsive, does take spontaneous ventilation, repeated myoclonic jerks mainly in face              LABS:    CBC:   Recent Labs      08/06/18   1524   WBC  13.1*   HGB  15.1   HCT  44.6   PLT  226   MCV  90.6     Renal:    Recent Labs      08/06/18   0953  08/06/18   1524   NA   --   142   K   --   3.1*   CL   --   106   CO2  20*  21   BUN   --   31*   CREATININE  1.9*  1.4*   GLUCOSE   --   215*   CALCIUM   --   10.6   MG   --   3.70*   PHOS   --   3.7   ANIONGAP   --   15     Hepatic:   Recent Labs      08/06/18   1524   AST  421*   ALT  293*   BILITOT  1.3*   BILIDIR  0.3   PROT  7.1   LABALBU  4.4   ALKPHOS  74     Troponin:   Recent Labs      08/06/18   [address] [facility]  PGY-1  Medicine Resident  Pager: [phone]  8/7/2018   8:10 AM      Patient seen and examined, labs and imaging studies reviewed, agree with assessment and plan as outlined above. Continue with current care and plan discussed with family at bedside.  Discussed with Dr. Minda Armendariz MD FACP

## 2018-08-07 NOTE — PLAN OF CARE
Problem: Risk for Impaired Skin Integrity  Goal: Tissue integrity - skin and mucous membranes  Structural intactness and normal physiological function of skin and  mucous membranes. Outcome: Ongoing  Pt was turned Q2 hours earlier this shift. Since withdrawal of care and extubation family requests to not turn pt and leave him comfortable    Problem: Pain:  Goal: Control of acute pain  Control of acute pain   Outcome: Ongoing  Pt DNRCC. Comfort meds ordered Q15 mins.

## 2018-08-08 NOTE — FLOWSHEET NOTE
08/07/18 1300   Encounter Summary   Volunteer Visit (Received Anointing of the Sick by Fr. Kwabena Lamb.)   Sacraments   Sacrament of Sick-Anointing Patient requested anointing